# Patient Record
(demographics unavailable — no encounter records)

---

## 2025-04-10 NOTE — REASON FOR VISIT
[Initial Evaluation] : an initial evaluation of [Palpitations] : palpitations [Patient] : patient [Mother] : mother [Dizziness/Lightheadedness] : dizziness/lightheadedness

## 2025-04-16 NOTE — DISCUSSION/SUMMARY
[FreeTextEntry1] : JOVANNA  is a healthy, thriving 16 year old who presents without identified concerns for congestive heart failure nor impaired cardiac output by her  past medical history nor on her  current physical exam. her  EKG and echocardiogram were further reassuring.   -Normal biventricular size and systolic function. No effusion. Aortic arch appears patent. There are robust distal pulses and indicies of cardiac output. Normotensive to mildly elevated systolic blood pressure during our evaluation. Recommended home blood pressure monitoring.   -I discussed at length with the family that the elevated blood pressures are not related to cardiac pathology, and that further evaluation will be conducted at your office (or in consultation with other subspecialties such as Nephrology), should you feel it is necessary. We discussed non-pharmacologic ways of lowering blood pressure, such as healthy eating habits, exercise, and maintaining a healthy weight.  Anti-hypertensive medications may be used at your discretion, if needed.  - Frequent orthostatic dizziness in setting of prior fasting, inadequate daily hydration and relatively sedentary lifestyle. Making lifestyle changes including better appetite and some hydration improvement which has helped. Normotensive with positional change but slight decrease in systolic blood pressure. There is an exaggerated heart rate response. Frequent emesis and abdominal pain with avoidance of eating and significant weight loss remain contributing factors.  I recommended the following at this time:  -We discussed the need to maintain adequate hydration, drinking at least 8-10 full glasses of water per day.   -We discussed eating an adequate, healthy diet.  -We discussed standing up slowly from a lying or seated position to avoid these episodes, as well as recognizing the warning signs (lightheadedness, nausea) and sitting or lying down when they occur.   -If necessary, increasing salt intake may also help alleviate these symptoms but if deemed to be persistently hypertensive would be best to avoid.   -We discussed management strategies including medication should symptoms worsen or fail to improve.  -Maintain regular aerobic exercise; reviewed symptoms with exercise that should prompt medical evaluation   - In summary, JOVANNA GREENBERG is a 16 year old female with palpitations.  I discussed at length with the family the causes of palpitations in children of this age group, which may represent an arrhythmia but also could simply represent an increased awareness of her  own heart beat (especially during periods of activity, dehydration, pain or anxiety, when a "stronger" heart beat may be noted). I recommended she  keep a journal of ongoing episodes and to contact our office should symptoms worsen or fail to improve. We discussed symptoms that should prompt medical attention immediately as well as reviewed symptoms of an arrhythmia. I recommended a 48 hour holter monitor for further assessment.   -Discussed with parent recommendation for psychiatric consultation for management of anxiety and depression. Parent reports Jovanna has been doing better.   I recommended 2 month follow up or sooner pending symptoms, Holter results and we reviewed signs and symptoms that should prompt medical attention and sooner evaluation. Above information explained in detail with assistance of a colored diagram.  JVOANNA and family verbalized their understanding, agreed with the plan and all questions were answered.  [Needs SBE Prophylaxis] : [unfilled] does not need bacterial endocarditis prophylaxis [May participate in all age-appropriate activities] : [unfilled] May participate in all age-appropriate activities.

## 2025-04-16 NOTE — HISTORY OF PRESENT ILLNESS
[FreeTextEntry1] : Jovanna is a well appearing, 16 year old referred for evaluation of her episodes of dizziness and palpitations. Parent reports elevated blood pressure readings recently. She is accompanied by her parent that provided additional information.   Jovanna reports frequent episodes of intermittent orthostatic dizziness over the past month. Episodes are brief, triggered by upright positional change. Improve with sitting. Denies syncope. Episodes with tinnitus.  History of frequent emesis and abdominal pain, fasting and inadequate hydration. Has been doing better to hydrate and eat regularly which has helped the frequency of episodes more recently. Parent reports a 60 lb weight loss over a 3 month period. Eating better now. Follows with GI. Reports two meals per day. Estimates only around 20 oz of water daily. Denies excessive caffeine intake.   In regards to palpitations, She reports it as a possible harder heart rate and "tightness or pressure." Episodes occur when lying down at night or when in bed in general. Distraction helps and she reports a gradual return to normal. No captured rates. Possible sensation of shortness of breath.   Could improve regular exercise. There has been no history of exercise induced symptoms nor recent changes in exercise tolerance or activity levels. No chest pain, dizziness or syncope with exercise.  Has anxiety and depression. General myalgias. Denies arthritis.   Parent reports possible past elevated blood pressures. No measurements or trends to report.  There has been no recent fevers, illnesses or hospitalizations.    Brother: "hole"-nos No additionally reported family history of an arrhythmia, aortic aneurysm, unexplained death, bicuspid aortic valve,  congenital heart disease, cardiomyopathy or sudden cardiac death, long QT syndrome, drowning or unexplained accidental death.

## 2025-04-16 NOTE — DISCUSSION/SUMMARY
[FreeTextEntry1] : JOVANNA  is a healthy, thriving 16 year old who presents without identified concerns for congestive heart failure nor impaired cardiac output by her  past medical history nor on her  current physical exam. her  EKG and echocardiogram were further reassuring.   -Normal biventricular size and systolic function. No effusion. Aortic arch appears patent. There are robust distal pulses and indicies of cardiac output. Normotensive to mildly elevated systolic blood pressure during our evaluation. Recommended home blood pressure monitoring.   -I discussed at length with the family that the elevated blood pressures are not related to cardiac pathology, and that further evaluation will be conducted at your office (or in consultation with other subspecialties such as Nephrology), should you feel it is necessary. We discussed non-pharmacologic ways of lowering blood pressure, such as healthy eating habits, exercise, and maintaining a healthy weight.  Anti-hypertensive medications may be used at your discretion, if needed.  - Frequent orthostatic dizziness in setting of prior fasting, inadequate daily hydration and relatively sedentary lifestyle. Making lifestyle changes including better appetite and some hydration improvement which has helped. Normotensive with positional change but slight decrease in systolic blood pressure. There is an exaggerated heart rate response. Frequent emesis and abdominal pain with avoidance of eating and significant weight loss remain contributing factors.  I recommended the following at this time:  -We discussed the need to maintain adequate hydration, drinking at least 8-10 full glasses of water per day.   -We discussed eating an adequate, healthy diet.  -We discussed standing up slowly from a lying or seated position to avoid these episodes, as well as recognizing the warning signs (lightheadedness, nausea) and sitting or lying down when they occur.   -If necessary, increasing salt intake may also help alleviate these symptoms but if deemed to be persistently hypertensive would be best to avoid.   -We discussed management strategies including medication should symptoms worsen or fail to improve.  -Maintain regular aerobic exercise; reviewed symptoms with exercise that should prompt medical evaluation   - In summary, JOVANNA GREENBERG is a 16 year old female with palpitations.  I discussed at length with the family the causes of palpitations in children of this age group, which may represent an arrhythmia but also could simply represent an increased awareness of her  own heart beat (especially during periods of activity, dehydration, pain or anxiety, when a "stronger" heart beat may be noted). I recommended she  keep a journal of ongoing episodes and to contact our office should symptoms worsen or fail to improve. We discussed symptoms that should prompt medical attention immediately as well as reviewed symptoms of an arrhythmia. I recommended a 48 hour holter monitor for further assessment.   -Discussed with parent recommendation for psychiatric consultation for management of anxiety and depression. Parent reports Jovanna has been doing better.   I recommended 2 month follow up or sooner pending symptoms, Holter results and we reviewed signs and symptoms that should prompt medical attention and sooner evaluation. Above information explained in detail with assistance of a colored diagram.  JOVANNA and family verbalized their understanding, agreed with the plan and all questions were answered.  [Needs SBE Prophylaxis] : [unfilled] does not need bacterial endocarditis prophylaxis [May participate in all age-appropriate activities] : [unfilled] May participate in all age-appropriate activities.

## 2025-04-16 NOTE — REVIEW OF SYSTEMS
[Feeling Poorly] : feeling poorly (malaise) [Wgt Loss (___ Lbs)] : recent [unfilled] lb weight loss [Change in Vision] : change in vision [Palpitations] : palpitations [Fast HR] : tachycardia [Abdominal Pain] : abdominal pain [Headache] : headache [Dizziness] : dizziness [Depression] : depression [Joint Pains] : arthralgias [Easy Bruising] : a tendency for easy bruising [Anxiety] : anxiety [Heat/Cold Intolerance] : temperature intolerance [Fever] : no fever [Pallor] : not pale [Eye Discharge] : no eye discharge [Redness] : no redness [Nasal Stuffiness] : no nasal congestion [Sore Throat] : no sore throat [Earache] : no earache [Loss Of Hearing] : no hearing loss [Cyanosis] : no cyanosis [Edema] : no edema [Diaphoresis] : not diaphoretic [Chest Pain] : no chest pain or discomfort [Exercise Intolerance] : no persistence of exercise intolerance [Orthopnea] : no orthopnea [Tachypnea] : not tachypneic [Wheezing] : no wheezing [Cough] : no cough [Shortness Of Breath] : not expressed as feeling short of breath [Vomiting] : no vomiting [Diarrhea] : no diarrhea [Decrease In Appetite] : appetite not decreased [Fainting (Syncope)] : no fainting [Seizure] : no seizures [Limping] : no limping [Joint Swelling] : no joint swelling [Rash] : no rash [Wound problems] : no wound problems [Swollen Glands] : no lymphadenopathy [Easy Bleeding] : no ~M tendency for easy bleeding [Nosebleeds] : no epistaxis [Sleep Disturbances] : ~T no sleep disturbances [Hyperactive] : no hyperactive behavior [Failure To Thrive] : no failure to thrive [Short Stature] : short stature was not noted [Jitteriness] : no jitteriness [Dec Urine Output] : no oliguria

## 2025-04-16 NOTE — CARDIOLOGY SUMMARY
[LVSF ___%] : LV Shortening Fraction [unfilled]% [de-identified] : 4/10/25 [FreeTextEntry1] : Sinus tachycardia @ 102 bpm ME: 136 ms QRS: 80 ms  QTc: 430 ms P-R-T Axis (66-74-54) Normal voltage and intervals No ST segment abnormalities No pre-excitation  [de-identified] : 4/10/25 [FreeTextEntry2] :  A complete 2D, M-mode, doppler and color flow doppler transthoracic pediatric echocardiogram was performed. The intracardiac anatomy and doppler flow profiles were otherwise normal appearing with the following:   Summary: 1. No evidence of an atrial septal defect. 2. Aortic arch appears patent. 3. Physiologic tricuspid valve regurgitation. 4. Physiologic mitral valve regurgitation. 5. Normal right ventricular morphology with qualitatively normal size and systolic function. 6. Normal left ventricular size, morphology and systolic function. 7. No pericardial effusion.

## 2025-04-16 NOTE — CARDIOLOGY SUMMARY
[LVSF ___%] : LV Shortening Fraction [unfilled]% [de-identified] : 4/10/25 [FreeTextEntry1] : Sinus tachycardia @ 102 bpm OR: 136 ms QRS: 80 ms  QTc: 430 ms P-R-T Axis (66-74-54) Normal voltage and intervals No ST segment abnormalities No pre-excitation  [de-identified] : 4/10/25 [FreeTextEntry2] :  A complete 2D, M-mode, doppler and color flow doppler transthoracic pediatric echocardiogram was performed. The intracardiac anatomy and doppler flow profiles were otherwise normal appearing with the following:   Summary: 1. No evidence of an atrial septal defect. 2. Aortic arch appears patent. 3. Physiologic tricuspid valve regurgitation. 4. Physiologic mitral valve regurgitation. 5. Normal right ventricular morphology with qualitatively normal size and systolic function. 6. Normal left ventricular size, morphology and systolic function. 7. No pericardial effusion.

## 2025-04-16 NOTE — CONSULT LETTER
[Today's Date] : [unfilled] [Name] : Name: [unfilled] [] : : ~~ [Today's Date:] : [unfilled] [Dear  ___:] : Dear Dr. [unfilled]: [Consult] : I had the pleasure of evaluating your patient, [unfilled]. My full evaluation follows. [Consult - Single Provider] : Thank you very much for allowing me to participate in the care of this patient. If you have any questions, please do not hesitate to contact me. [Sincerely,] : Sincerely, [FreeTextEntry4] : Fuad Brown, DO [FreeTextEntry5] : [address] [FreeTextEntry6] : [place] [address] [de-identified] : Kale Carballo DO, MPH Pediatric Cardiology [facility]

## 2025-04-16 NOTE — CONSULT LETTER
[Today's Date] : [unfilled] [Name] : Name: [unfilled] [] : : ~~ [Today's Date:] : [unfilled] [Dear  ___:] : Dear Dr. [unfilled]: [Consult] : I had the pleasure of evaluating your patient, [unfilled]. My full evaluation follows. [Consult - Single Provider] : Thank you very much for allowing me to participate in the care of this patient. If you have any questions, please do not hesitate to contact me. [Sincerely,] : Sincerely, [FreeTextEntry4] : Fuad Brown, DO [FreeTextEntry5] : [address] [FreeTextEntry6] : [place] [address] [de-identified] : Kale Carballo DO, MPH Pediatric Cardiology [facility]